# Patient Record
(demographics unavailable — no encounter records)

---

## 2025-06-10 NOTE — HISTORY OF PRESENT ILLNESS
[de-identified] : GRANT MACEDO  is a pleasant 51 year old woman  who came in 06/05/2025 for back cyst. She has Dr JANE YANG as Her PCP.  she has this upper back cyst for long time

## 2025-06-10 NOTE — PROCEDURE
[FreeTextEntry1] : patient was consented for the procedure   complications were discussed including: infection, bleeding, wound dehiscence, need to repeat surgery for positive margins, need for adjuvant treatment, recurrence of tumor, poor cosmetic outcomes, anaesthesia related   complications, other perioperative morbidities and mortality     preoperative diagnosis upper back cystic mass postoperative diagnosis same   procedure wide local excision of    3.5 X 3 X 2  cm with local advancement fasciocutaneous flap to close a skin defect  counts : correct   specimen : 12: 00 superior sutured   complications: non   procedure details:   patient was prepped and draped in usual sterile fashion using betadine. infiltration with lidocain/epi was performed to elliptical skin around lesion. we achieved a margin of 0.5-1cm around malignant lesion and used #15 surgical blade. we divided superficial fascia and subcutaneous fat to deep fascia. we raised skin flap on each side to achieve fasciocutaneous local advancement flap to close the skin defect.  we avoid electrocautery to preserve   neurovascular bundles in the local flaps. the superficial fascia/deep dermis were closed using 2-0 and 3-0 vicryl and skin was closed with subcuticular fashion using 4-0 monocryl. sterile dressing was applied and patient experience no pain during procedure.

## 2025-06-10 NOTE — ASSESSMENT
[FreeTextEntry1] : GRANT MACEDO  is a pleasant 51 year old woman  who came in 06/05/2025 for back cyst. She has Dr JANE YANG as Her PCP.  she has this upper back cyst for long time   will see in two months to eval wound and review path  the above plan of care with discussed in details to the patient and all questions were answered to patient satisfaction. patient instructed to follow up with the referring physician and patient primary care provider   A total of 60 minutes was spent on this visit, obtaining h/p,  reviewing previous notes/imaging, counseling the patient on coming procedure and f/u , ordering tests (below), and documenting the findings in the note.

## 2025-06-19 NOTE — ASSESSMENT
[FreeTextEntry1] : GRANT MACEDO  is a pleasant 51 year old woman  who came in 06/05/2025 for back cyst. She has Dr JANE YANG as Her PCP.  she has this upper back cyst for long time   will see in two weeks to eval wound and review path  6/19/25 returns today post excision of back cyst on 6/5/25. Site has healed well. Path reviewed->  Final Diagnosis Upper back cyst, excision: - Benign keratinous cyst May return to office prn  the above plan of care with discussed in details to the patient and all questions were answered to patient satisfaction. patient instructed to follow up with the referring physician and patient primary care provider   A total of  minutes was spent on this visit, obtaining h/p,  reviewing previous notes/imaging, counseling the patient on coming procedure and f/u , ordering tests (below), and documenting the findings in the note.

## 2025-06-19 NOTE — HISTORY OF PRESENT ILLNESS
[de-identified] : GRANT MACEDO  is a pleasant 51 year old woman  who came in 06/05/2025 for back cyst. She has Dr JANE YANG as Her PCP.  she has this upper back cyst for long time  6/19/25 post op visit [Post-Op Complications] : No post-op complications reported

## 2025-06-19 NOTE — HISTORY OF PRESENT ILLNESS
[de-identified] : GRANT MACEDO  is a pleasant 51 year old woman  who came in 06/05/2025 for back cyst. She has Dr JANE YANG as Her PCP.  she has this upper back cyst for long time  6/19/25 post op visit [Post-Op Complications] : No post-op complications reported